# Patient Record
Sex: FEMALE | Race: OTHER
[De-identification: names, ages, dates, MRNs, and addresses within clinical notes are randomized per-mention and may not be internally consistent; named-entity substitution may affect disease eponyms.]

---

## 2017-06-12 ENCOUNTER — HOSPITAL ENCOUNTER (EMERGENCY)
Dept: HOSPITAL 17 - NEPD | Age: 47
Discharge: HOME | End: 2017-06-12
Payer: COMMERCIAL

## 2017-06-12 VITALS — HEIGHT: 65 IN | WEIGHT: 212.75 LBS | BODY MASS INDEX: 35.45 KG/M2

## 2017-06-12 VITALS
OXYGEN SATURATION: 98 % | SYSTOLIC BLOOD PRESSURE: 162 MMHG | TEMPERATURE: 98.3 F | DIASTOLIC BLOOD PRESSURE: 95 MMHG | RESPIRATION RATE: 20 BRPM | HEART RATE: 96 BPM

## 2017-06-12 DIAGNOSIS — S61.032A: Primary | ICD-10-CM
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
## 2017-06-12 PROCEDURE — 99281 EMR DPT VST MAYX REQ PHY/QHP: CPT

## 2017-06-12 NOTE — PD
HPI


Chief Complaint:  Exposure to Blood/Body Fluids


Time Seen by Provider:  12:50


Travel History


International Travel<30 days:  No


Contact w/Intl Traveler<30days:  No


Traveled to known affect area:  No





History of Present Illness


HPI


47 year old female presents after a needlestick injury.  The patient reports 

that she was drawing blood from a butterfly needle today on the oncology unit 

of this hospital when she accidentally poked her left thumb with the needle.  

This went through her gloves.  There is a small amount of bleeding.  She now 

presents for postexposure protocol.  The source patient has no known history of 

clinical diseases.  According to the patient the charge nurse of the oncology 

floor was notified and source patient testing is underway.  This patient 

personally has no history of hepatitis B/C/HIV.  Last tetanus vaccination 

within 5 years.  No other complaints.





PFSH


Past Medical History


Anxiety:  Yes


Diminished Hearing:  No


Hypertension:  Yes


Kidney Stones:  Yes


Pregnant?:  Not Pregnant


:  3


Para:  3


Miscarriage:  0


:  0


Tubal Ligation:  Yes ()





Past Surgical History


 Section:  Yes ()


Other Surgery:  Yes (lithotripsy)





Social History


Alcohol Use:  Yes (RARELY)


Tobacco Use:  No


Substance Use:  No





Allergies-Medications


(Allergen,Severity, Reaction):  


Coded Allergies:  


     No Known Allergies (Verified , 17)


Reported Meds & Prescriptions





Reported Meds & Active Scripts


Active


Reported


[anxiety medication]     


Effexor (Venlafaxine HCl) 75 Mg Tab 75 Mg PO DAILY








Review of Systems


General / Constitutional:  No: Fever


Skin:  Positive Other (positive for needlestick, bruise)





Physical Exam


Narrative


GENERAL: Well-developed well-nourished female in no acute distress


SKIN: Warm and dry.  Small area of ecchymosis noted to the finger pad of the 

left thumb.


CARDIOVASCULAR: Regular rate and rhythm.  No murmur appreciated.


RESPIRATORY: No accessory muscle use. Clear to auscultation. Breath sounds 

equal bilaterally.





Data


Data


Last Documented VS





Vital Signs








  Date Time  Temp Pulse Resp B/P Pulse Ox O2 Delivery O2 Flow Rate FiO2


 


17 12:31 98.3 96 20 162/95 98 Room Air  











MDM


Medical Decision Making


Medical Screen Exam Complete:  Yes


Emergency Medical Condition:  Yes


Medical Record Reviewed:  Yes


Differential Diagnosis


Puncture wound, needle stick, HIV exposure, hepatitis exposure


Narrative Course


Postexposure protocol will be followed, HIV prophylaxis is not recommended, the 

patient will follow up with [employer] [facility].





Diagnosis





 Primary Impression:  


 Needlestick injury of finger of left hand


Referrals:  


[employer] [facility]


***Med/Other Pt SpecificInfo:  No Change to Meds


Disposition:  01 DISCHARGE HOME


Condition:  Stable








Arcadio Cason 2017 13:00

## 2017-09-27 ENCOUNTER — HOSPITAL ENCOUNTER (EMERGENCY)
Dept: HOSPITAL 17 - NEPK | Age: 47
Discharge: HOME | End: 2017-09-27
Payer: COMMERCIAL

## 2017-09-27 VITALS — BODY MASS INDEX: 33.79 KG/M2 | WEIGHT: 202.83 LBS | HEIGHT: 65 IN

## 2017-09-27 VITALS
OXYGEN SATURATION: 99 % | SYSTOLIC BLOOD PRESSURE: 184 MMHG | RESPIRATION RATE: 16 BRPM | HEART RATE: 83 BPM | TEMPERATURE: 98.7 F | DIASTOLIC BLOOD PRESSURE: 95 MMHG
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
## 2017-09-27 DIAGNOSIS — R51: ICD-10-CM

## 2017-09-27 DIAGNOSIS — I10: ICD-10-CM

## 2017-09-27 DIAGNOSIS — S09.90XA: Primary | ICD-10-CM

## 2017-09-27 PROCEDURE — 96372 THER/PROPH/DIAG INJ SC/IM: CPT

## 2017-09-27 PROCEDURE — 99284 EMERGENCY DEPT VISIT MOD MDM: CPT

## 2017-09-27 NOTE — PD
HPI


Chief Complaint:  Fall


Time Seen by Provider:  09:16


Travel History


International Travel<30 days:  No


Contact w/Intl Traveler<30days:  No


Traveled to known affect area:  No





History of Present Illness


HPI


47-year-old female presents to the emergency Department with complaint of 

headache after falling out of a stool and hitting the back of her head.  Denies 

loss of consciousness.  Reports history of migraines.  Says even a small bump 

to her head exacerbates her migraine headaches.  Says her current symptoms are 

similar to onset of migraine headache.  Denies focal deficits or weakness.  

Denies confusion, disorientation, change in mentation, slurred speech.  Denies 

nausea, vomiting.  Reports photophobia.  Denies change in vision.  Has not 

taken any medication or tried any treatments to alleviate her symptoms.  

Symptoms are moderate in severity.  Has no other medical complaints.  No known 

allergies.  No other modifying factors or associated signs and symptoms.





PFSH


Past Medical History


Anxiety:  Yes


Diminished Hearing:  No


Hypertension:  Yes


Kidney Stones:  Yes


Pregnant?:  Not Pregnant


:  3


Para:  3


Miscarriage:  0


:  0


Tubal Ligation:  Yes ()





Past Surgical History


 Section:  Yes ()


Other Surgery:  Yes (lithotripsy)





Social History


Alcohol Use:  Yes (RARELY)


Tobacco Use:  No


Substance Use:  No





Allergies-Medications


(Allergen,Severity, Reaction):  


Coded Allergies:  


     No Known Allergies (Verified , 17)


Reported Meds & Prescriptions





Reported Meds & Active Scripts


Active


Ibuprofen 800 Mg Tab 800 Mg PO Q6HR PRN


Reported


[anxiety medication]     


Effexor (Venlafaxine HCl) 75 Mg Tab 75 Mg PO DAILY








Review of Systems


Except as stated in HPI:  all other systems reviewed are Neg





Physical Exam


Narrative


GENERAL: Well-nourished, well-developed  female patient, in no acute 

distress


SKIN: Warm and dry.


HEAD: Atraumatic. Normocephalic.  No scalp hematoma, laceration noted to 

posterior scalp.  No facial droop noted.  Tongue midline.


EYES: Pupils equal and round at 3 mm with brisk reaction. No scleral icterus. 

No injection or drainage.  PERRLA.  EOMI. No raccoon eyes.


EARS: Bilateral pinnae and external canals appear within normal limits. 

Bilateral tympanic membranes without  erythema, dullness, hemotympanum or 

perforation.  No otorrhea.  No cox signs.


ENT: Mucosa pink and moist. No erythema or exudates. No uvular edema. No uvular

, palatal, or tonsillar deviation.  


Airway patent.  Nose without purulent or bloody drainage.


NECK: Trachea midline.  No lymphadenopathy.  


CARDIOVASCULAR: Regular rate.


RESPIRATORY: No accessory muscle use.


GASTROINTESTINAL: Round.


MUSCULOSKELETAL: No obvious deformities. No clubbing.  No cyanosis.  No edema. 


NEUROLOGICAL: Awake and alert.  Oriented 3.  No obvious cranial nerve 

deficits.  Motor grossly within normal limits. Normal speech. No ataxia.  No mid

-line drift.  Moves all extremities.  5/5 strength to all extremities.


PSYCHIATRIC: Appropriate mood and affect; insight and judgment normal.





Data


Data


Last Documented VS





Vital Signs








  Date Time  Temp Pulse Resp B/P (MAP) Pulse Ox O2 Delivery O2 Flow Rate FiO2


 


17 08:58 98.7 83 16 184/95 (124) 99 Room Air  








Orders





 Orders


Ketorolac Inj (Toradol Inj) (17 09:30)








[facility]


Medical Decision Making


Medical Screen Exam Complete:  Yes


Emergency Medical Condition:  Yes


Medical Record Reviewed:  Yes


Differential Diagnosis


Closed head injury, headache, scalp contusion


Narrative Course


47-year-old female with history of migraine headache with onset of symptoms of 

migraine after falling from a stool and hitting the back of her head.  Denies 

loss of consciousness.  Neuro exam is unremarkable.  Patient denies nausea or 

vomiting.  The patient admits to hitting their head, but denies loss of 

consciousness.  Denies nausea, vomiting.  On physical exam the patient is 

without raccoon eyes, cox signs, rhinorrhea, or hemotympanum.  I do not 

suspect open or depressed skull fracture, and the patient has no signs of 

basilar skull fracture.  [place] CT Head Injury Rule suggests a head CT is not 

necessary for this patient and clears the patient for head injury without 

imaging.  Toradol administered in the ER.


1021:  Patient reports improvement in headache.  Ibuprofen prescribed for home.

  Instructed patient to follow up with primary care provider.  Patient 

verbalizes understanding and agreement with treatment plan.  Patient is 

medically cleared and stable for discharge.  Discussed reasons to return to the 

emergency department.  Patient agrees with treatment plan.  The patients vital 

signs are stable and the patient is stable for outpatient follow-up and 

treatment.  Patient discharged home, stable and in no acute distress.





Diagnosis





 Primary Impression:  


 Closed head injury


 Qualified Codes:  S09.90XA - Unspecified injury of head, initial encounter


 Additional Impression:  


 Headache


 Qualified Codes:  R51 - Headache


Referrals:  


[facility] [place] [place] [facility]





Primary Care Physician


Patient Instructions:  General Instructions, Head Injury (ED), Migraine 

Headache (ED)





***Additional Instructions:  


Ibuprofen or Tylenol instructed nausea for pain


Ice to affected area as needed to reduce pain and inflammation


Follow-up with primary care provider


Return to the emergency department immediately, particularly with symptoms as 

discussed


***Med/Other Pt SpecificInfo:  Prescription(s) given


Scripts


Ibuprofen (Ibuprofen) 800 Mg Tab


800 MG PO Q6HR Y for PAIN, #30 TAB 0 Refills


   Prov: Susan Nichols         17


Disposition:  01 DISCHARGE HOME


Condition:  Stable











Susan Nichols Sep 27, 2017 09:17

## 2018-03-24 ENCOUNTER — HOSPITAL ENCOUNTER (EMERGENCY)
Dept: HOSPITAL 17 - NED | Age: 48
Discharge: LEFT BEFORE DISCHARGE | End: 2018-03-24
Payer: COMMERCIAL

## 2018-03-24 VITALS
DIASTOLIC BLOOD PRESSURE: 63 MMHG | RESPIRATION RATE: 16 BRPM | HEART RATE: 93 BPM | OXYGEN SATURATION: 98 % | SYSTOLIC BLOOD PRESSURE: 139 MMHG | TEMPERATURE: 98.7 F

## 2018-03-24 VITALS — WEIGHT: 213.85 LBS | BODY MASS INDEX: 35.63 KG/M2 | HEIGHT: 65 IN

## 2018-03-24 DIAGNOSIS — Z53.21: Primary | ICD-10-CM

## 2018-03-24 PROCEDURE — 99281 EMR DPT VST MAYX REQ PHY/QHP: CPT
